# Patient Record
Sex: FEMALE | Race: ASIAN | NOT HISPANIC OR LATINO | Employment: STUDENT | URBAN - METROPOLITAN AREA
[De-identification: names, ages, dates, MRNs, and addresses within clinical notes are randomized per-mention and may not be internally consistent; named-entity substitution may affect disease eponyms.]

---

## 2023-08-18 ENCOUNTER — OFFICE VISIT (OUTPATIENT)
Dept: URGENT CARE | Facility: CLINIC | Age: 23
End: 2023-08-18
Payer: COMMERCIAL

## 2023-08-18 VITALS
BODY MASS INDEX: 18.61 KG/M2 | DIASTOLIC BLOOD PRESSURE: 71 MMHG | SYSTOLIC BLOOD PRESSURE: 113 MMHG | OXYGEN SATURATION: 99 % | WEIGHT: 105 LBS | RESPIRATION RATE: 17 BRPM | TEMPERATURE: 99 F | HEART RATE: 66 BPM | HEIGHT: 63 IN

## 2023-08-18 DIAGNOSIS — L25.5 CONTACT DERMATITIS DUE TO PLANTS, EXCEPT FOOD, UNSPECIFIED CONTACT DERMATITIS TYPE: ICD-10-CM

## 2023-08-18 DIAGNOSIS — L02.416 CUTANEOUS ABSCESS OF LEFT KNEE: Primary | ICD-10-CM

## 2023-08-18 PROCEDURE — 99499 UNLISTED E&M SERVICE: CPT | Mod: S$GLB,,, | Performed by: NURSE PRACTITIONER

## 2023-08-18 PROCEDURE — 99499 NO LOS: ICD-10-PCS | Mod: S$GLB,,, | Performed by: NURSE PRACTITIONER

## 2023-08-18 RX ORDER — DOXYCYCLINE 100 MG/1
100 CAPSULE ORAL 2 TIMES DAILY
Qty: 20 CAPSULE | Refills: 0 | Status: SHIPPED | OUTPATIENT
Start: 2023-08-18

## 2023-08-18 RX ORDER — MUPIROCIN 20 MG/G
OINTMENT TOPICAL 3 TIMES DAILY
Qty: 22 G | Refills: 0 | Status: SHIPPED | OUTPATIENT
Start: 2023-08-18

## 2023-08-18 RX ORDER — MUPIROCIN 20 MG/G
OINTMENT TOPICAL
Status: COMPLETED | OUTPATIENT
Start: 2023-08-18 | End: 2023-08-18

## 2023-08-18 RX ADMIN — MUPIROCIN: 20 OINTMENT TOPICAL at 12:08

## 2023-08-18 NOTE — PATIENT INSTRUCTIONS
Please follow up with your primary care provider within 2-5 days if your signs and symptoms have not resolved or worsen.     If your condition worsens or fails to improve we recommend that you receive another evaluation at the emergency room immediately or contact your primary medical clinic to discuss your concerns.   You must understand that you have received an Urgent Care treatment only and that you may be released before all of your medical problems are known or treated. You, the patient, will arrange for follow up care as instructed.     Keep wound clean with Hibiclens twice daily then apply mupirocin ointment and cover with bandage.  Do not pick or poke wound!  Please take antibiotics to completion!

## 2023-08-18 NOTE — PROGRESS NOTES
"Subjective:      Patient ID: Cong Rojas is a 22 y.o. female.    Vitals:  height is 5' 3" (1.6 m) and weight is 47.6 kg (105 lb). Her oral temperature is 98.5 °F (36.9 °C). Her blood pressure is 113/71 and her pulse is 66. Her respiration is 17 and oxygen saturation is 99%.     Chief Complaint: Rash (LEFT KNEE AND LEFT ARM)    Patient presents with rash on right arm red in color and raised and a rash or bite area on the left knee region since two weeks ago and gradually worsening. Patient states this may have occurred while cutting a tree at home    Her aunt used a needle to puncture wound last night.    Rash  This is a new problem. The current episode started in the past 7 days. The problem has been gradually worsening since onset. The affected locations include the left lower leg and right arm. The rash is characterized by blistering, itchiness, redness and swelling. It is unknown if there was an exposure to a precipitant. Pertinent negatives include no anorexia, congestion, cough, diarrhea, eye pain, facial edema, fatigue, fever, joint pain, nail changes, rhinorrhea, shortness of breath, sore throat or vomiting. Past treatments include anti-itch cream. The treatment provided no relief. There is no history of allergies, asthma, eczema or varicella.       Constitution: Negative for activity change, appetite change, chills, sweating, fatigue and fever.   HENT:  Negative for congestion and sore throat.    Neck: Negative for neck pain, neck stiffness and painful lymph nodes.   Cardiovascular:  Negative for chest pain, leg swelling, palpitations and sob on exertion.   Eyes:  Negative for eye pain.   Respiratory:  Negative for chest tightness, cough and shortness of breath.    Gastrointestinal:  Negative for abdominal pain, vomiting and diarrhea.   Musculoskeletal:  Negative for pain, trauma, joint pain, joint swelling, abnormal ROM of joint, muscle cramps and muscle ache.   Skin:  Positive for rash, wound and erythema. " Negative for color change.   Neurological:  Negative for dizziness, history of vertigo, passing out, disorientation and altered mental status.   Hematologic/Lymphatic: Negative for swollen lymph nodes and easy bruising/bleeding. Does not bruise/bleed easily.   Psychiatric/Behavioral:  Negative for altered mental status, disorientation and confusion.       Objective:     Physical Exam   Constitutional: She is oriented to person, place, and time.  Non-toxic appearance. She does not appear ill. No distress.   HENT:   Head: Normocephalic and atraumatic.   Ears:   Right Ear: External ear normal.   Left Ear: External ear normal.   Nose: Nose normal.   Mouth/Throat: Mucous membranes are moist. No oropharyngeal exudate or posterior oropharyngeal erythema. Oropharynx is clear.   Eyes: Conjunctivae are normal.   Cardiovascular: Normal rate, regular rhythm, normal heart sounds and normal pulses.   Pulmonary/Chest: Effort normal and breath sounds normal.   Abdominal: Normal appearance.   Musculoskeletal: Normal range of motion.         General: No swelling, tenderness, deformity or signs of injury. Normal range of motion.      Right lower leg: No edema.      Left lower leg: No edema.   Neurological: no focal deficit. She is alert, oriented to person, place, and time and at baseline.   Skin: Skin is warm, dry and not diaphoretic. erythema and lesion   Psychiatric: Her behavior is normal. Mood, judgment and thought content normal.   Nursing note and vitals reviewed.                  Assessment:     1. Cutaneous abscess of left knee    2. Contact dermatitis due to plants, except food, unspecified contact dermatitis type        Plan:       Cutaneous abscess of left knee  -     mupirocin 2 % ointment  -     Physician communication order  -     mupirocin (BACTROBAN) 2 % ointment; Apply topically 3 (three) times daily.  Dispense: 22 g; Refill: 0  -     doxycycline (VIBRAMYCIN) 100 MG Cap; Take 1 capsule (100 mg total) by mouth 2 (two)  times daily.  Dispense: 20 capsule; Refill: 0    Contact dermatitis due to plants, except food, unspecified contact dermatitis type             Patient Instructions   Please follow up with your primary care provider within 2-5 days if your signs and symptoms have not resolved or worsen.     If your condition worsens or fails to improve we recommend that you receive another evaluation at the emergency room immediately or contact your primary medical clinic to discuss your concerns.   You must understand that you have received an Urgent Care treatment only and that you may be released before all of your medical problems are known or treated. You, the patient, will arrange for follow up care as instructed.     Keep wound clean with Hibiclens twice daily then apply mupirocin ointment and cover with bandage.  Do not pick or poke wound!  Please take antibiotics to completion!

## 2023-10-04 ENCOUNTER — OFFICE VISIT (OUTPATIENT)
Dept: URGENT CARE | Facility: CLINIC | Age: 23
End: 2023-10-04
Payer: COMMERCIAL

## 2023-10-04 VITALS
TEMPERATURE: 98 F | HEART RATE: 69 BPM | SYSTOLIC BLOOD PRESSURE: 110 MMHG | DIASTOLIC BLOOD PRESSURE: 74 MMHG | WEIGHT: 112 LBS | BODY MASS INDEX: 19.84 KG/M2 | RESPIRATION RATE: 18 BRPM | HEIGHT: 63 IN | OXYGEN SATURATION: 98 %

## 2023-10-04 DIAGNOSIS — Z23 NEED FOR MMR VACCINE: ICD-10-CM

## 2023-10-04 DIAGNOSIS — Z92.29 HISTORY OF BCG VACCINATION: ICD-10-CM

## 2023-10-04 DIAGNOSIS — Z23 NEED FOR TDAP VACCINATION: ICD-10-CM

## 2023-10-04 DIAGNOSIS — Z11.1 SCREENING FOR TUBERCULOSIS: ICD-10-CM

## 2023-10-04 DIAGNOSIS — Z23 INFLUENZA VACCINE NEEDED: ICD-10-CM

## 2023-10-04 DIAGNOSIS — Z02.0 ENCOUNTER FOR SCHOOL HISTORY AND PHYSICAL EXAMINATION: Primary | ICD-10-CM

## 2023-10-04 PROCEDURE — 90707 MMR VACCINE SC: CPT | Mod: S$GLB,,, | Performed by: NURSE PRACTITIONER

## 2023-10-04 PROCEDURE — 90471 FLU VACCINE (QUAD) GREATER THAN OR EQUAL TO 3YO PRESERVATIVE FREE IM: ICD-10-PCS | Mod: S$GLB,,, | Performed by: NURSE PRACTITIONER

## 2023-10-04 PROCEDURE — 99499 NO LOS: ICD-10-PCS | Mod: S$GLB,,, | Performed by: NURSE PRACTITIONER

## 2023-10-04 PROCEDURE — 90715 TDAP VACCINE 7 YRS/> IM: CPT | Mod: S$GLB,,, | Performed by: NURSE PRACTITIONER

## 2023-10-04 PROCEDURE — 90472 MMR VACCINE SQ: ICD-10-PCS | Mod: S$GLB,,, | Performed by: NURSE PRACTITIONER

## 2023-10-04 PROCEDURE — 90707 MMR VACCINE SQ: ICD-10-PCS | Mod: S$GLB,,, | Performed by: NURSE PRACTITIONER

## 2023-10-04 PROCEDURE — 90471 IMMUNIZATION ADMIN: CPT | Mod: S$GLB,,, | Performed by: NURSE PRACTITIONER

## 2023-10-04 PROCEDURE — 90686 IIV4 VACC NO PRSV 0.5 ML IM: CPT | Mod: S$GLB,,, | Performed by: NURSE PRACTITIONER

## 2023-10-04 PROCEDURE — 90472 IMMUNIZATION ADMIN EACH ADD: CPT | Mod: S$GLB,,, | Performed by: NURSE PRACTITIONER

## 2023-10-04 PROCEDURE — 90686 FLU VACCINE (QUAD) GREATER THAN OR EQUAL TO 3YO PRESERVATIVE FREE IM: ICD-10-PCS | Mod: S$GLB,,, | Performed by: NURSE PRACTITIONER

## 2023-10-04 PROCEDURE — 90715 TDAP VACCINE GREATER THAN OR EQUAL TO 7YO IM: ICD-10-PCS | Mod: S$GLB,,, | Performed by: NURSE PRACTITIONER

## 2023-10-04 PROCEDURE — 99499 UNLISTED E&M SERVICE: CPT | Mod: S$GLB,,, | Performed by: NURSE PRACTITIONER

## 2023-10-04 RX ORDER — CLOBETASOL PROPIONATE 0.5 MG/G
CREAM TOPICAL
COMMUNITY
Start: 2023-08-24

## 2023-10-04 NOTE — PATIENT INSTRUCTIONS
Please follow up with your primary care provider within 2-5 days if your signs and symptoms have not resolved or worsen.     If your condition worsens or fails to improve we recommend that you receive another evaluation at the emergency room immediately or contact your primary medical clinic to discuss your concerns.   You must understand that you have received an Urgent Care treatment only and that you may be released before all of your medical problems are known or treated. You, the patient, will arrange for follow up care as instructed.       You have completed the physical exam portion of your [employer] application.  You received the following immunizations today: MMR (Dose #1), Tdap, and Annual Influenza.  You will need to return to [facility] no earlier than 11/4/2023 to receive the second dose of your MMR vaccine series.  You will receive a call from our office to notify you of your QuantiFERON test results as soon as we receive it. You may stop by the clinic after you are notified to  a copy of your result to include with your application. At this time, we will also provide you with a copy of your most current immunization record via LINKS.  It is very important that your abstain from sexual intercourse or implement measures to prevent pregnancy for at least 2 months following receipt of the MMR vaccine.

## 2023-10-04 NOTE — PROGRESS NOTES
"Subjective:      Patient ID: Cong Rojas is a 22 y.o. female.    Vitals:  height is 5' 3" (1.6 m) and weight is 50.8 kg (112 lb). Her temperature is 97.9 °F (36.6 °C). Her blood pressure is 110/74 and her pulse is 69. Her respiration is 18 and oxygen saturation is 98%.     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  She presents with a copy of her Certificate of Immunization documents which were administered in her native country for review.    ** She does report prior receipt of the BCG vaccine and is confirmed on her vaccine records**      Constitution:        See attached [employer] CON H&P documents      Objective:     Physical Exam   Constitutional:      Comments:See attached [employer] CON H&P documents         Assessment:     1. Encounter for school history and physical examination    2. Screening for tuberculosis    3. History of BCG vaccination    4. Influenza vaccine needed    5. Need for MMR vaccine    6. Need for Tdap vaccination        Plan:       Encounter for school history and physical examination  -     QuantiFERON-TB Gold Plus; Future; Expected date: 10/04/2023  -     Influenza - Quadrivalent *Preferred* (6 months+) (PF)  -     (In Office Administered) MMR Vaccine (SQ)  -     (In Office Administered) Tdap Vaccine    Screening for tuberculosis  -     QuantiFERON-TB Gold Plus; Future; Expected date: 10/04/2023    History of BCG vaccination  -     QuantiFERON-TB Gold Plus; Future; Expected date: 10/04/2023    Influenza vaccine needed  -     Influenza - Quadrivalent *Preferred* (6 months+) (PF)    Need for MMR vaccine  -     (In Office Administered) MMR Vaccine (SQ)    Need for Tdap vaccination  -     (In Office Administered) Tdap Vaccine        Patient Instructions   Please follow up with your primary care provider within 2-5 days if " your signs and symptoms have not resolved or worsen.     If your condition worsens or fails to improve we recommend that you receive another evaluation at the emergency room immediately or contact your primary medical clinic to discuss your concerns.   You must understand that you have received an Urgent Care treatment only and that you may be released before all of your medical problems are known or treated. You, the patient, will arrange for follow up care as instructed.       You have completed the physical exam portion of your [employer] application.  You received the following immunizations today: MMR (Dose #1), Tdap, and Annual Influenza.  You will need to return to [facility] clinic no earlier than 11/4/2023 to receive the second dose of your MMR vaccine series.  You will receive a call from our office to notify you of your QuantiFERON test results as soon as we receive it. You may stop by the clinic after you are notified to  a copy of your result to include with your application. At this time, we will also provide you with a copy of your most current immunization record via LINKS.  It is very important that your abstain from sexual intercourse or implement measures to prevent pregnancy for at least 2 months following receipt of the MMR vaccine.    Medical Decision Making:   Urgent Care Management:  Pt presents with a copy of her Certificate of Immunization documentation for receipt of vaccines received in her native country.   She previously completed COVID-19 and Hepatitis B 3 dose series; she received 1 of 2 doses of MMR vaccine. No PPD skin test performed since she previously received BCG vaccine. QuantiFERON TB-Gold was ordered in lieu of PPD skin test.    Today she received MMR (dose #1), Influenza, and Tdap vaccines.    Prior to MMR vaccine administration, pt's LMP date of 9/21/23 was verified and documented. She is not sexually active. She was instructed to abstain from sexual  intercourse or use pregnancy prevention methods for at least 2 months following MMR vaccine administration.     She was instructed to return no earlier than 11/4/2023 for administration of 2nd dose MMR.    Will notify pt of QuantiFERON TB Gold test result upon receipt.

## 2023-10-06 ENCOUNTER — TELEPHONE (OUTPATIENT)
Dept: URGENT CARE | Facility: CLINIC | Age: 23
End: 2023-10-06
Payer: COMMERCIAL

## 2023-10-06 LAB — M TB IFN-G BLD-IMP: NORMAL

## 2023-10-06 NOTE — PROGRESS NOTES
Will notify pt of need to recollect specimen since lab notified clinic that was not performed; specimen yielded no plasma after processing.

## 2023-10-06 NOTE — TELEPHONE ENCOUNTER
Outgoing call to pt for notification of need to return to clinic for specimen recollection since lab notified clinic that was not performed; specimen yielded no plasma after processing.    No answer; left voicemail requesting pt return my call to discuss invalid test specimen.

## 2023-10-09 ENCOUNTER — OFFICE VISIT (OUTPATIENT)
Dept: URGENT CARE | Facility: CLINIC | Age: 23
End: 2023-10-09
Payer: COMMERCIAL

## 2023-10-09 VITALS
TEMPERATURE: 99 F | DIASTOLIC BLOOD PRESSURE: 68 MMHG | WEIGHT: 110 LBS | OXYGEN SATURATION: 98 % | BODY MASS INDEX: 19.49 KG/M2 | SYSTOLIC BLOOD PRESSURE: 103 MMHG | HEART RATE: 76 BPM | RESPIRATION RATE: 18 BRPM | HEIGHT: 63 IN

## 2023-10-09 DIAGNOSIS — Z92.29 HISTORY OF BCG VACCINATION: ICD-10-CM

## 2023-10-09 DIAGNOSIS — Z11.1 SCREENING FOR TUBERCULOSIS: Primary | ICD-10-CM

## 2023-10-09 DIAGNOSIS — Z02.0 ENCOUNTER FOR SCHOOL HISTORY AND PHYSICAL EXAMINATION: ICD-10-CM

## 2023-10-09 PROCEDURE — 99499 UNLISTED E&M SERVICE: CPT | Mod: S$GLB,,, | Performed by: NURSE PRACTITIONER

## 2023-10-09 PROCEDURE — 99499 NO LOS: ICD-10-PCS | Mod: S$GLB,,, | Performed by: NURSE PRACTITIONER

## 2023-10-11 ENCOUNTER — TELEPHONE (OUTPATIENT)
Dept: URGENT CARE | Facility: CLINIC | Age: 23
End: 2023-10-11
Payer: COMMERCIAL

## 2023-10-11 LAB
GAMMA INTERFERON BACKGROUND BLD IA-ACNC: 0.02 IU/ML
M TB IFN-G BLD-IMP: NEGATIVE
M TB IFN-G CD4+ BCKGRND COR BLD-ACNC: 0 IU/ML
M TB IFN-G CD4+CD8+ BCKGRND COR BLD-ACNC: 0.01 IU/ML
MITOGEN IGNF BCKGRD COR BLD-ACNC: 8.55 IU/ML

## 2023-10-11 NOTE — TELEPHONE ENCOUNTER
I called the patient and told her about her negative TB results. I will leave a copy of the results at the  and she can come and pick it up. She talha.

## 2023-11-13 ENCOUNTER — IMMUNIZATION (OUTPATIENT)
Dept: URGENT CARE | Facility: CLINIC | Age: 23
End: 2023-11-13
Payer: COMMERCIAL

## 2023-11-13 DIAGNOSIS — Z23 IMMUNIZATION DUE: Primary | ICD-10-CM

## 2023-11-13 LAB
B-HCG UR QL: NEGATIVE
CTP QC/QA: YES

## 2023-11-13 PROCEDURE — 90707 MMR VACCINE SC: CPT | Mod: S$GLB,,, | Performed by: FAMILY MEDICINE

## 2023-11-13 PROCEDURE — 81025 POCT URINE PREGNANCY: ICD-10-PCS | Mod: S$GLB,,, | Performed by: FAMILY MEDICINE

## 2023-11-13 PROCEDURE — 90471 MMR VACCINE SQ: ICD-10-PCS | Mod: S$GLB,,, | Performed by: FAMILY MEDICINE

## 2023-11-13 PROCEDURE — 90471 IMMUNIZATION ADMIN: CPT | Mod: S$GLB,,, | Performed by: FAMILY MEDICINE

## 2023-11-13 PROCEDURE — 81025 URINE PREGNANCY TEST: CPT | Mod: S$GLB,,, | Performed by: FAMILY MEDICINE

## 2023-11-13 PROCEDURE — 90707 MMR VACCINE SQ: ICD-10-PCS | Mod: S$GLB,,, | Performed by: FAMILY MEDICINE

## 2023-11-13 NOTE — PROGRESS NOTES
Subjective:      Patient ID: Cong Rojas is a 23 y.o. female.    Vitals:  vitals were not taken for this visit.     Chief Complaint: Immunizations    Patient presents for 2nd dose of MMR vaccination  ROS   Objective:     Physical Exam    Assessment:     No diagnosis found.    Plan:       There are no diagnoses linked to this encounter.

## 2024-03-05 ENCOUNTER — OFFICE VISIT (OUTPATIENT)
Dept: URGENT CARE | Facility: CLINIC | Age: 24
End: 2024-03-05
Payer: COMMERCIAL

## 2024-03-05 VITALS
RESPIRATION RATE: 18 BRPM | OXYGEN SATURATION: 98 % | WEIGHT: 105 LBS | HEIGHT: 62 IN | DIASTOLIC BLOOD PRESSURE: 73 MMHG | HEART RATE: 69 BPM | TEMPERATURE: 98 F | SYSTOLIC BLOOD PRESSURE: 113 MMHG | BODY MASS INDEX: 19.32 KG/M2

## 2024-03-05 DIAGNOSIS — Z11.3 SCREENING FOR STD (SEXUALLY TRANSMITTED DISEASE): Primary | ICD-10-CM

## 2024-03-05 PROCEDURE — 99499 UNLISTED E&M SERVICE: CPT | Mod: S$GLB,,, | Performed by: STUDENT IN AN ORGANIZED HEALTH CARE EDUCATION/TRAINING PROGRAM

## 2024-03-05 NOTE — PROGRESS NOTES
"Subjective:      Patient ID: Cong Rojas is a 23 y.o. female.    Vitals:  height is 5' 2" (1.575 m) and weight is 47.6 kg (105 lb). Her temperature is 98 °F (36.7 °C). Her blood pressure is 113/73 and her pulse is 69. Her respiration is 18 and oxygen saturation is 98%.     Chief Complaint: Exposure to STD    [employer] student presents for Std testing. 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     Exposure to STD       Genitourinary:  Negative for vaginal discharge and vaginal bleeding.      Objective:     Physical Exam   Cardiovascular: Normal rate, regular rhythm and normal heart sounds.   Pulmonary/Chest: Effort normal and breath sounds normal.   Abdominal: Normal appearance.   Neurological: She is alert.       Assessment:     1. Screening for STD (sexually transmitted disease)        Plan:       Screening for STD (sexually transmitted disease)  -     SureSwab(R)Chlamydia/N.Gonorrhoeae RNA,TMA    Please follow up with your primary care provider within 2-5 days if your signs and symptoms have not resolved or worsen.     If your condition worsens or fails to improve we recommend that you receive another evaluation at the emergency room immediately or contact your primary medical clinic to discuss your concerns.   You must understand that you have received an Urgent Care treatment only and that you may be released before all of your medical problems are known or treated. You, the patient, will arrange for follow up care as instructed.       We will call you with the results.          Medical Decision Making:   Differential Diagnosis:   STD testing  Clinical Tests:   Lab Tests: Ordered and Reviewed       <> Summary of Lab: GC urine  Urgent Care Management:  [employer] student presents for Std testing. 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   PE is unremarkable. GC urine was ordered on patient. I discussed with the patient that when her test results come back that I would give her a call. "

## 2024-03-05 NOTE — PATIENT INSTRUCTIONS
Please follow up with your primary care provider within 2-5 days if your signs and symptoms have not resolved or worsen.     If your condition worsens or fails to improve we recommend that you receive another evaluation at the emergency room immediately or contact your primary medical clinic to discuss your concerns.   You must understand that you have received an Urgent Care treatment only and that you may be released before all of your medical problems are known or treated. You, the patient, will arrange for follow up care as instructed.       We will call you with the results.

## 2024-03-08 LAB
APTIMA MEDIA TYPE: NORMAL
C. TRACHOMATIS DNA PROBE RESULT:: NEGATIVE
N. GONORRHOEAE DNA PROBE RESULT:: NEGATIVE
SPECIMEN SOURCE: NORMAL

## 2024-03-14 ENCOUNTER — TELEPHONE (OUTPATIENT)
Dept: URGENT CARE | Facility: CLINIC | Age: 24
End: 2024-03-14
Payer: COMMERCIAL